# Patient Record
Sex: FEMALE | Race: WHITE | NOT HISPANIC OR LATINO | Employment: FULL TIME
[De-identification: names, ages, dates, MRNs, and addresses within clinical notes are randomized per-mention and may not be internally consistent; named-entity substitution may affect disease eponyms.]

---

## 2022-04-06 ENCOUNTER — TRANSCRIBE ORDERS (OUTPATIENT)
Dept: OTHER | Age: 32
End: 2022-04-06

## 2022-04-06 DIAGNOSIS — M54.50 LOW BACK PAIN: Primary | ICD-10-CM

## 2022-04-20 ENCOUNTER — THERAPY VISIT (OUTPATIENT)
Dept: PHYSICAL THERAPY | Facility: CLINIC | Age: 32
End: 2022-04-20
Attending: ORTHOPAEDIC SURGERY
Payer: COMMERCIAL

## 2022-04-20 DIAGNOSIS — M54.50 LOW BACK PAIN: ICD-10-CM

## 2022-04-20 PROCEDURE — 97110 THERAPEUTIC EXERCISES: CPT | Mod: GP | Performed by: PHYSICAL THERAPIST

## 2022-04-20 PROCEDURE — 97530 THERAPEUTIC ACTIVITIES: CPT | Mod: GP | Performed by: PHYSICAL THERAPIST

## 2022-04-20 PROCEDURE — 97161 PT EVAL LOW COMPLEX 20 MIN: CPT | Mod: GP | Performed by: PHYSICAL THERAPIST

## 2022-04-20 NOTE — PROGRESS NOTES
Physical Therapy Initial Evaluation  Subjective:  The history is provided by the patient. No  was used.   Patient Health History  Elen Saez being seen for Low back pain around L4-S1 area.     Problem began: 4/6/2022.   Problem occurred: coughing/working overtime; ongoing back issues but exacerbated in early April. She had a coughing episode on 4/6/22 which she felt threw out her low back   Pain is reported as 1/10 on pain scale.  General health as reported by patient is good.  Pertinent medical history includes: none.   Red flags:  None as reported by patient.             Current occupation is [employer].   Primary job tasks include:  Pushing/pulling, repetitive tasks, prolonged standing and lifting/carrying.                  Therapist Generated HPI Evaluation  Problem details: Patient with chronic back pain but much worse pain this month into R lower side of lumbar spine. Patient reports it is because her right leg is shorter than her left.         Type of problem:  Lumbar.    This is a new condition.  Occurance: coughing episode and working overtime.  Where condition occurred: for unknown reasons.  Patient reports pain:  Lower lumbar spine.  Pain quality: dull ache. and is constant.  Pain radiates to:  Gluteals right. Pain is the same all the time.  Since onset symptoms are gradually improving.  Associated symptoms:  Loss of motion/stiffness. Symptoms are exacerbated by standing, sitting, certain positions and bending (any position for too long especially sitting)      Previous treatment includes chiropractic. There was none improvement following previous treatment.  Work activity restrictions: currently not working due to back pain.  Barriers include:  None as reported by patient.                        Objective:  Standing Alignment:        Lumbar:  Anterior pelvic tilt                           Lumbar/SI Evaluation  ROM:    AROM Lumbar:   Flexion:              To mid tibia, pulling pain on  R low lumbar region  Ext:                    Min loss   Side Bend:        Left:  Normal    Right:  Normal  Rotation:           Left:  Normal    Right:  Normal  Side Glide:        Left:  Normal    Right:  Normal        Strength: R hip abd 4/5, B hip ext 4/5, B hip ER 4/5; fair abdominal activation   Lumbar Myotomes:  normal                Lumbar Dermtomes:  normal                Neural Tension/Mobility:  Lumbar:  Normal        Lumbar Palpation:  normal (no tenderness throughout lumbar spine, posterior or lateral hips)      Functional Tests:    Plank prone:  10/60 sec      Lumbar Provocation:  normal      Spinal Segmental Conclusions: Mildly hypomobile lower lumbar spine, no pain with central PAs                                                         General     ROS    Assessment/Plan:    Patient is a 31 year old female with lumbar complaints.    Patient has the following significant findings with corresponding treatment plan.                Diagnosis 1:  Low back pain  Pain -  hot/cold therapy, manual therapy, self management, education, directional preference exercise and home program  Decreased ROM/flexibility - manual therapy, therapeutic exercise and home program  Decreased joint mobility - manual therapy, therapeutic exercise and home program  Decreased strength - therapeutic exercise, therapeutic activities and home program  Impaired muscle performance - neuro re-education and home program  Impaired posture - neuro re-education and home program    Therapy Evaluation Codes:   Cumulative Therapy Evaluation is: Low complexity.    Previous and current functional limitations:  (See Goal Flow Sheet for this information)    Short term and Long term goals: (See Goal Flow Sheet for this information)     Communication ability:  Patient appears to be able to clearly communicate and understand verbal and written communication and follow directions correctly.  Treatment Explanation - The following has been discussed with  the patient:   RX ordered/plan of care  Anticipated outcomes  Possible risks and side effects  This patient would benefit from PT intervention to resume normal activities.   Rehab potential is good.    Frequency:  1 X week, once daily  Duration:  for 6 weeks  Discharge Plan:  Achieve all LTG.  Independent in home treatment program.  Reach maximal therapeutic benefit.    Please refer to the daily flowsheet for treatment today, total treatment time and time spent performing 1:1 timed codes.

## 2022-05-05 ENCOUNTER — THERAPY VISIT (OUTPATIENT)
Dept: PHYSICAL THERAPY | Facility: CLINIC | Age: 32
End: 2022-05-05
Payer: COMMERCIAL

## 2022-05-05 DIAGNOSIS — M54.50 LOW BACK PAIN: Primary | ICD-10-CM

## 2022-05-05 PROCEDURE — 97530 THERAPEUTIC ACTIVITIES: CPT | Mod: GP

## 2022-05-05 PROCEDURE — 97110 THERAPEUTIC EXERCISES: CPT | Mod: GP
